# Patient Record
(demographics unavailable — no encounter records)

---

## 2025-03-20 NOTE — ASSESSMENT
[Chronic Prostatitis (601.1\N41.1)] : of ~T knee [FreeTextEntry1] : Complaining of incontinence. It appears to be from the urethra after he voids  Also complaining of testicular pain on the right and discoloration  He has a neg pelvic ultrasound Psoriasis noted on the left side of base of penis Refer to Dermatology  Right epididymal cyst Benign  Family history of Ca P Will do PSA  Prostate 26 cc Benign  May have prostatitis  Will treat with Doxycycline x 6 weeks

## 2025-03-20 NOTE — HISTORY OF PRESENT ILLNESS
[Urinary Incontinence] : urinary incontinence [Urinary Frequency] : urinary frequency [FreeTextEntry1] : Seen in August for  incontinence Treated for UTI and incontinence cleared

## 2025-05-22 NOTE — HISTORY OF PRESENT ILLNESS
[FreeTextEntry1] : pt with luts and incontinence. seen March 2025. placed on doxycycline. pt with incontinence. in past. feels voiding well and is very good voiding.  26 cc prostate. limits fluids at night. once in a while urge.  dermatologist cured rash with ointment.   Follow up as needed in 2 years.